# Patient Record
Sex: MALE | Race: WHITE | Employment: FULL TIME | URBAN - METROPOLITAN AREA
[De-identification: names, ages, dates, MRNs, and addresses within clinical notes are randomized per-mention and may not be internally consistent; named-entity substitution may affect disease eponyms.]

---

## 2021-05-05 ENCOUNTER — HOSPITAL ENCOUNTER (EMERGENCY)
Age: 76
Discharge: HOME | End: 2021-05-05
Attending: EMERGENCY MEDICINE
Payer: MEDICARE

## 2021-05-05 ENCOUNTER — APPOINTMENT (OUTPATIENT)
Dept: CT IMAGING | Age: 76
End: 2021-05-05
Attending: EMERGENCY MEDICINE
Payer: MEDICARE

## 2021-05-05 VITALS
DIASTOLIC BLOOD PRESSURE: 68 MMHG | BODY MASS INDEX: 28.2 KG/M2 | OXYGEN SATURATION: 96 % | HEIGHT: 70 IN | TEMPERATURE: 98.9 F | RESPIRATION RATE: 16 BRPM | HEART RATE: 59 BPM | SYSTOLIC BLOOD PRESSURE: 153 MMHG | WEIGHT: 197 LBS

## 2021-05-05 DIAGNOSIS — N20.1 URETERAL STONE: Primary | ICD-10-CM

## 2021-05-05 LAB
ALBUMIN SERPL-MCNC: 4.1 G/DL (ref 3.5–5)
ALBUMIN/GLOB SERPL: 1.6 {RATIO} (ref 1.1–2.2)
ALP SERPL-CCNC: 46 U/L (ref 45–117)
ALT SERPL-CCNC: 21 U/L (ref 12–78)
ANION GAP SERPL CALC-SCNC: 7 MMOL/L (ref 5–15)
APPEARANCE UR: ABNORMAL
AST SERPL-CCNC: 15 U/L (ref 15–37)
BACTERIA URNS QL MICRO: NEGATIVE /HPF
BASOPHILS # BLD: 0 K/UL (ref 0–0.1)
BASOPHILS NFR BLD: 0 % (ref 0–1)
BILIRUB SERPL-MCNC: 0.7 MG/DL (ref 0.2–1)
BILIRUB UR QL: NEGATIVE
BUN SERPL-MCNC: 27 MG/DL (ref 6–20)
BUN/CREAT SERPL: 22 (ref 12–20)
CALCIUM SERPL-MCNC: 9.3 MG/DL (ref 8.5–10.1)
CHLORIDE SERPL-SCNC: 105 MMOL/L (ref 97–108)
CO2 SERPL-SCNC: 30 MMOL/L (ref 21–32)
COLOR UR: ABNORMAL
COMMENT, HOLDF: NORMAL
CREAT SERPL-MCNC: 1.25 MG/DL (ref 0.7–1.3)
DIFFERENTIAL METHOD BLD: ABNORMAL
EOSINOPHIL # BLD: 0.1 K/UL (ref 0–0.4)
EOSINOPHIL NFR BLD: 1 % (ref 0–7)
EPITH CASTS URNS QL MICRO: NORMAL /LPF
ERYTHROCYTE [DISTWIDTH] IN BLOOD BY AUTOMATED COUNT: 13.1 % (ref 11.5–14.5)
GLOBULIN SER CALC-MCNC: 2.6 G/DL (ref 2–4)
GLUCOSE SERPL-MCNC: 108 MG/DL (ref 65–100)
GLUCOSE UR STRIP.AUTO-MCNC: NEGATIVE MG/DL
HCT VFR BLD AUTO: 43 % (ref 36.6–50.3)
HGB BLD-MCNC: 15.5 G/DL (ref 12.1–17)
HGB UR QL STRIP: ABNORMAL
IMM GRANULOCYTES # BLD AUTO: 0 K/UL (ref 0–0.04)
IMM GRANULOCYTES NFR BLD AUTO: 0 % (ref 0–0.5)
KETONES UR QL STRIP.AUTO: NEGATIVE MG/DL
LEUKOCYTE ESTERASE UR QL STRIP.AUTO: NEGATIVE
LYMPHOCYTES # BLD: 0.9 K/UL (ref 0.8–3.5)
LYMPHOCYTES NFR BLD: 10 % (ref 12–49)
MAGNESIUM SERPL-MCNC: 2 MG/DL (ref 1.6–2.4)
MCH RBC QN AUTO: 33.3 PG (ref 26–34)
MCHC RBC AUTO-ENTMCNC: 36 G/DL (ref 30–36.5)
MCV RBC AUTO: 92.5 FL (ref 80–99)
MONOCYTES # BLD: 0.4 K/UL (ref 0–1)
MONOCYTES NFR BLD: 4 % (ref 5–13)
NEUTS SEG # BLD: 8.4 K/UL (ref 1.8–8)
NEUTS SEG NFR BLD: 85 % (ref 32–75)
NITRITE UR QL STRIP.AUTO: NEGATIVE
NRBC # BLD: 0 K/UL (ref 0–0.01)
NRBC BLD-RTO: 0 PER 100 WBC
PH UR STRIP: 6 [PH] (ref 5–8)
PLATELET # BLD AUTO: 262 K/UL (ref 150–400)
PMV BLD AUTO: 9.4 FL (ref 8.9–12.9)
POTASSIUM SERPL-SCNC: 4.2 MMOL/L (ref 3.5–5.1)
PROT SERPL-MCNC: 6.7 G/DL (ref 6.4–8.2)
PROT UR STRIP-MCNC: NEGATIVE MG/DL
RBC # BLD AUTO: 4.65 M/UL (ref 4.1–5.7)
RBC #/AREA URNS HPF: NORMAL /HPF (ref 0–5)
SODIUM SERPL-SCNC: 142 MMOL/L (ref 136–145)
SP GR UR REFRACTOMETRY: 1.02 (ref 1–1.03)
UROBILINOGEN UR QL STRIP.AUTO: 0.2 EU/DL (ref 0.2–1)
WBC # BLD AUTO: 9.9 K/UL (ref 4.1–11.1)
WBC URNS QL MICRO: NORMAL /HPF (ref 0–4)

## 2021-05-05 PROCEDURE — 83735 ASSAY OF MAGNESIUM: CPT

## 2021-05-05 PROCEDURE — 81001 URINALYSIS AUTO W/SCOPE: CPT

## 2021-05-05 PROCEDURE — 99284 EMERGENCY DEPT VISIT MOD MDM: CPT

## 2021-05-05 PROCEDURE — 85025 COMPLETE CBC W/AUTO DIFF WBC: CPT

## 2021-05-05 PROCEDURE — 74011250636 HC RX REV CODE- 250/636: Performed by: EMERGENCY MEDICINE

## 2021-05-05 PROCEDURE — 96374 THER/PROPH/DIAG INJ IV PUSH: CPT

## 2021-05-05 PROCEDURE — 80053 COMPREHEN METABOLIC PANEL: CPT

## 2021-05-05 PROCEDURE — 36415 COLL VENOUS BLD VENIPUNCTURE: CPT

## 2021-05-05 PROCEDURE — 74176 CT ABD & PELVIS W/O CONTRAST: CPT

## 2021-05-05 RX ORDER — LANOLIN ALCOHOL/MO/W.PET/CERES
1000 CREAM (GRAM) TOPICAL DAILY
COMMUNITY

## 2021-05-05 RX ORDER — FISH OIL/DHA/EPA 1200-144MG
1200 CAPSULE ORAL DAILY
COMMUNITY

## 2021-05-05 RX ORDER — TEMAZEPAM 30 MG/1
30 CAPSULE ORAL
COMMUNITY

## 2021-05-05 RX ORDER — GLUCOSAMINE SULFATE 1500 MG
1000 POWDER IN PACKET (EA) ORAL DAILY
COMMUNITY

## 2021-05-05 RX ORDER — HYDROCODONE BITARTRATE AND ACETAMINOPHEN 5; 325 MG/1; MG/1
1 TABLET ORAL
Qty: 12 TAB | Refills: 0 | Status: SHIPPED | OUTPATIENT
Start: 2021-05-05 | End: 2021-05-08

## 2021-05-05 RX ORDER — NAPROXEN 250 MG/1
250 TABLET ORAL DAILY
COMMUNITY

## 2021-05-05 RX ORDER — TAMSULOSIN HYDROCHLORIDE 0.4 MG/1
0.4 CAPSULE ORAL DAILY
COMMUNITY

## 2021-05-05 RX ORDER — PSEUDOEPHEDRINE HCL 30 MG
30 TABLET ORAL
COMMUNITY

## 2021-05-05 RX ORDER — ASPIRIN 325 MG
325 TABLET ORAL 2 TIMES DAILY
COMMUNITY

## 2021-05-05 RX ORDER — LORATADINE 10 MG/1
10 TABLET ORAL
COMMUNITY

## 2021-05-05 RX ORDER — KETOROLAC TROMETHAMINE 30 MG/ML
15 INJECTION, SOLUTION INTRAMUSCULAR; INTRAVENOUS
Status: COMPLETED | OUTPATIENT
Start: 2021-05-05 | End: 2021-05-05

## 2021-05-05 RX ADMIN — SODIUM CHLORIDE 1000 ML: 9 INJECTION, SOLUTION INTRAVENOUS at 18:01

## 2021-05-05 RX ADMIN — KETOROLAC TROMETHAMINE 15 MG: 30 INJECTION, SOLUTION INTRAMUSCULAR; INTRAVENOUS at 18:01

## 2021-05-05 NOTE — ED PROVIDER NOTES
53-year-old male with a history of prostate CA and multiple and recurrent kidney stones presents with a chief complaint of left flank pain. Patient states that the pain started several days ago. He has been seen by [facility] but has not had imaging done. He states that the pain feels very similar to kidney stones he has had in the past.  He has had multiple lithotripsy procedures and stents in the past.  He denies any dysuria, hematuria, fevers or other symptoms. No past medical history on file. No past surgical history on file. No family history on file.     Social History     Socioeconomic History    Marital status: Not on file     Spouse name: Not on file    Number of children: Not on file    Years of education: Not on file    Highest education level: Not on file   Occupational History    Not on file   Social Needs    Financial resource strain: Not on file    Food insecurity     Worry: Not on file     Inability: Not on file    Transportation needs     Medical: Not on file     Non-medical: Not on file   Tobacco Use    Smoking status: Not on file   Substance and Sexual Activity    Alcohol use: Not on file    Drug use: Not on file    Sexual activity: Not on file   Lifestyle    Physical activity     Days per week: Not on file     Minutes per session: Not on file    Stress: Not on file   Relationships    Social connections     Talks on phone: Not on file     Gets together: Not on file     Attends Yarsani service: Not on file     Active member of club or organization: Not on file     Attends meetings of clubs or organizations: Not on file     Relationship status: Not on file    Intimate partner violence     Fear of current or ex partner: Not on file     Emotionally abused: Not on file     Physically abused: Not on file     Forced sexual activity: Not on file   Other Topics Concern    Not on file   Social History Narrative    Not on file         ALLERGIES: Celebrex [celecoxib], Glucosamine, Glucosamine-condroitin-herb182, Ibuprofen, and Symbicort [budesonide-formoterol]    Review of Systems   Constitutional: Negative for fever. HENT: Negative for rhinorrhea. Respiratory: Negative for cough. Cardiovascular: Negative for chest pain. Gastrointestinal: Negative for abdominal pain. Genitourinary: Positive for flank pain. Negative for dysuria. Musculoskeletal: Negative for back pain. Skin: Negative for wound. Neurological: Negative for headaches. Psychiatric/Behavioral: Negative for confusion. Vitals:    05/05/21 1708   BP: (!) 175/65   Pulse: 65   Resp: 18   Temp: 98.9 °F (37.2 °C)   SpO2: 97%   Weight: 89.4 kg (197 lb)   Height: 5' 10\" (1.778 m)            Physical Exam  Vitals signs and nursing note reviewed. Constitutional:       General: He is not in acute distress. Appearance: Normal appearance. He is not ill-appearing, toxic-appearing or diaphoretic. HENT:      Head: Normocephalic. Eyes:      Extraocular Movements: Extraocular movements intact. Neck:      Musculoskeletal: Normal range of motion. Cardiovascular:      Rate and Rhythm: Normal rate. Pulses: Normal pulses. Heart sounds: Normal heart sounds. No murmur. No gallop. Pulmonary:      Effort: Pulmonary effort is normal. No respiratory distress. Breath sounds: Normal breath sounds. No wheezing or rales. Abdominal:      General: Abdomen is flat. Bowel sounds are normal. There is no distension. Palpations: Abdomen is soft. Tenderness: There is no abdominal tenderness. There is no guarding. Musculoskeletal: Normal range of motion. Skin:     General: Skin is warm and dry. Capillary Refill: Capillary refill takes less than 2 seconds. Neurological:      Mental Status: He is alert and oriented to person, place, and time.    Psychiatric:         Mood and Affect: Mood normal.          MDM  Number of Diagnoses or Management Options  Ureteral stone  Diagnosis management comments: 42-year-old male with a history of recurrent ureteral stones presents with left flank pain consistent with prior stones. CT was obtained and shows a 3 mm distal ureteral stone. Urinalysis shows no evidence of infection and renal function is normal.  His pain was controlled with Toradol. He has close follow-up with [facility] already. He was prescribed Norco for pain control at home and is comfortable and agreeable with attempting to pass the stone at home. Discussed my clinical impression(s), any labs and/or radiology results with the patient. I answered any questions and addressed any concerns. Discussed the importance of following up with their primary care physician and/or specialist(s). Discussed signs or symptoms that would warrant return back to the ER for further evaluation. The patient is agreeable with discharge.        Amount and/or Complexity of Data Reviewed  Clinical lab tests: ordered and reviewed  Tests in the radiology section of CPT®: ordered and reviewed           Procedures

## 2021-05-05 NOTE — ED TRIAGE NOTES
Pt arrived with pain in left flank with a pain level at a 10. Pt went to his urologist 2 weeks ago,urologist stated he has 3 small stones in the right kidney. Attended a urology appointment because he was urinating a lot and feeling tender on both right and left flanks. Pt took pepto and anti-gas medications today before coming, however nothing for pain.

## 2021-05-05 NOTE — PROGRESS NOTES
[facility] Dosing Services    Assessment/Plan: Patient is 70years old. Ketorolac dose adjusted to 15 mg IV per P&T approved protocol for patient greater than 72years of age.     Murtaza Ball, Pharmacist

## 2021-05-05 NOTE — ED NOTES
The patient was discharged home by Dr. Helena Cook  in stable condition. The patient is alert and oriented, in no respiratory distress and discharge vital signs obtained. The patient's diagnosis, condition and treatment were explained. The patient expressed understanding. No prescriptions given. No work/school note given. A discharge plan has been developed. A  was not involved in the process. Aftercare instructions were given. Pt ambulatory out of the ED. Zully Do

## 2022-12-20 ENCOUNTER — HOSPITAL ENCOUNTER (EMERGENCY)
Age: 77
Discharge: HOME | End: 2022-12-20
Attending: EMERGENCY MEDICINE
Payer: MEDICARE

## 2022-12-20 VITALS
HEART RATE: 64 BPM | OXYGEN SATURATION: 97 % | SYSTOLIC BLOOD PRESSURE: 169 MMHG | HEIGHT: 70 IN | RESPIRATION RATE: 16 BRPM | WEIGHT: 202 LBS | DIASTOLIC BLOOD PRESSURE: 66 MMHG | BODY MASS INDEX: 28.92 KG/M2 | TEMPERATURE: 97.8 F

## 2022-12-20 DIAGNOSIS — H93.8X2 EAR FULLNESS, LEFT: Primary | ICD-10-CM

## 2022-12-20 PROCEDURE — 99283 EMERGENCY DEPT VISIT LOW MDM: CPT

## 2022-12-20 RX ORDER — AMOXICILLIN 875 MG/1
875 TABLET, FILM COATED ORAL 2 TIMES DAILY
Qty: 20 TABLET | Refills: 0 | Status: SHIPPED | OUTPATIENT
Start: 2022-12-20 | End: 2022-12-30

## 2022-12-20 RX ORDER — LISINOPRIL 2.5 MG/1
TABLET ORAL DAILY
COMMUNITY

## 2022-12-20 RX ORDER — GUAIFENESIN 600 MG/1
600 TABLET, EXTENDED RELEASE ORAL 2 TIMES DAILY
Qty: 10 TABLET | Refills: 0 | Status: SHIPPED | OUTPATIENT
Start: 2022-12-20 | End: 2022-12-25

## 2022-12-20 NOTE — ED TRIAGE NOTES
Pt presents ambulatory into ed a&ox3, no acute distress, breaths even and unlabored c/o L ear feeling blocked after being outside in the cold today at the park. Denies any pain. Reports this started today.

## 2022-12-20 NOTE — ED PROVIDER NOTES
68-year-old male presents from home with complaints of feeling like his left ear is blocked up. Symptoms started suddenly this afternoon he was outside in the cold at a park when he felt like something got in his ear which limited his hearing. He feels like there is wax or something occluding his ear canal.  Denies any pain. Denies any cough or fever. No drainage from the ear. States he did an earwax drops at home a few days ago and got a little bit of fluid out. 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 No other complaints at this time. Patient is new to this area and does not have an ENT doctor in this area that he is followed with in the past.       Past Medical History:   Diagnosis Date    Gastrointestinal disorder     Gout 2005    History of hemorrhoidectomy 1991    History of lithotripsy 2008    History of umbilical hernia repair 1009    Vertigo 2010       Past Surgical History:   Procedure Laterality Date    HX GI           History reviewed. No pertinent family history. Social History     Socioeconomic History    Marital status:      Spouse name: Not on file    Number of children: Not on file    Years of education: Not on file    Highest education level: Not on file   Occupational History    Not on file   Tobacco Use    Smoking status: Never    Smokeless tobacco: Never   Substance and Sexual Activity    Alcohol use:  Yes     Alcohol/week: 1.0 standard drink     Types: 1 Shots of liquor per week     Comment: per/day    Drug use: Never    Sexual activity: Not on file   Other Topics Concern    Not on file   Social History Narrative    Not on file     Social Determinants of Health     Financial Resource Strain: Not on file   Food Insecurity: Not on file   Transportation Needs: Not on file   Physical Activity: Not on file   Stress: Not on file   Social Connections: Not on file   Intimate Partner Violence: Not on file   Housing Stability: Not on file         ALLERGIES: Celebrex [celecoxib], Glucosamine, Glucosamine-condroitin-herb182, Ibuprofen, and Symbicort [budesonide-formoterol]    Review of Systems   Constitutional:  Negative for diaphoresis and fever. HENT:  Negative for facial swelling. Eyes:  Negative for visual disturbance. Respiratory:  Negative for cough. Cardiovascular:  Negative for chest pain. Gastrointestinal:  Negative for abdominal pain. Genitourinary:  Negative for dysuria. Musculoskeletal:  Negative for joint swelling. Skin:  Negative for rash. Neurological:  Negative for headaches. Hematological:  Negative for adenopathy. Psychiatric/Behavioral:  Negative for suicidal ideas. Vitals:    12/20/22 1636   BP: (!) 169/66   Pulse: 64   Resp: 16   Temp: 97.8 °F (36.6 °C)   SpO2: 97%   Weight: 91.6 kg (202 lb)   Height: 5' 10\" (1.778 m)            Physical Exam  Vitals and nursing note reviewed. Constitutional:       General: He is not in acute distress. Appearance: He is well-developed. He is not ill-appearing. HENT:      Head: Normocephalic and atraumatic. Right Ear: Tympanic membrane and ear canal normal.      Left Ear: Tympanic membrane normal.      Ears:      Comments: Small amount of earwax in the left ear canal.  No evidence of impaction. No foreign bodies. TM appears normal.  Eyes:      Pupils: Pupils are equal, round, and reactive to light. Cardiovascular:      Rate and Rhythm: Normal rate. Pulmonary:      Effort: Pulmonary effort is normal. No respiratory distress. Abdominal:      General: There is no distension. Musculoskeletal:         General: Normal range of motion. Cervical back: Normal range of motion and neck supple. Skin:     General: Skin is warm and dry. Neurological:      Mental Status: He is alert and oriented to person, place, and time.         MDM  Number of Diagnoses or Management Options  Ear fullness, left  Diagnosis management comments: Assessment: Patient here with a sudden onset of sensation of fullness in his left ear. Exam is really unremarkable with normal-appearing TM and only a small amount of earwax in the ear with no evidence of impaction. We irrigated the ear in the ED see if we could clear out any remaining wax and see if this help improve the symptoms. I do not see any obvious signs of an ear infection but would consider otitis media as a possible cause as well. I wrote a prescription for antibiotics. He could also have a middle ear effusion so we will try some decongestants. Patient is concerned because he had sudden hearing loss on his right side 12 years ago under similar circumstances. The etiology of this was never made clear. I have given him contact information follow-up with ENT in this area soon as possible for further evaluation.            Procedures

## 2022-12-22 ENCOUNTER — HOSPITAL ENCOUNTER (EMERGENCY)
Age: 77
Discharge: STILL ADMITTED OR ADMINISTRATIVE | End: 2022-12-22

## 2024-08-28 ENCOUNTER — ANESTHESIA EVENT (OUTPATIENT)
Facility: HOSPITAL | Age: 79
End: 2024-08-28
Payer: MEDICARE

## 2024-08-28 ENCOUNTER — HOSPITAL ENCOUNTER (OUTPATIENT)
Facility: HOSPITAL | Age: 79
Setting detail: OUTPATIENT SURGERY
Discharge: HOME | End: 2024-08-28
Attending: INTERNAL MEDICINE | Admitting: INTERNAL MEDICINE
Payer: MEDICARE

## 2024-08-28 ENCOUNTER — ANESTHESIA (OUTPATIENT)
Facility: HOSPITAL | Age: 79
End: 2024-08-28
Payer: MEDICARE

## 2024-08-28 VITALS
RESPIRATION RATE: 16 BRPM | BODY MASS INDEX: 27.21 KG/M2 | HEART RATE: 60 BPM | OXYGEN SATURATION: 96 % | TEMPERATURE: 97.9 F | SYSTOLIC BLOOD PRESSURE: 110 MMHG | HEIGHT: 70 IN | DIASTOLIC BLOOD PRESSURE: 56 MMHG | WEIGHT: 190.04 LBS

## 2024-08-28 PROCEDURE — 3700000001 HC ADD 15 MINUTES (ANESTHESIA): Performed by: INTERNAL MEDICINE

## 2024-08-28 PROCEDURE — 3700000000 HC ANESTHESIA ATTENDED CARE: Performed by: INTERNAL MEDICINE

## 2024-08-28 PROCEDURE — 6360000002 HC RX W HCPCS

## 2024-08-28 PROCEDURE — 7100000011 HC PHASE II RECOVERY - ADDTL 15 MIN: Performed by: INTERNAL MEDICINE

## 2024-08-28 PROCEDURE — 7100000010 HC PHASE II RECOVERY - FIRST 15 MIN: Performed by: INTERNAL MEDICINE

## 2024-08-28 PROCEDURE — 2580000003 HC RX 258

## 2024-08-28 PROCEDURE — 2709999900 HC NON-CHARGEABLE SUPPLY: Performed by: INTERNAL MEDICINE

## 2024-08-28 PROCEDURE — 3600007512: Performed by: INTERNAL MEDICINE

## 2024-08-28 PROCEDURE — 3600007502: Performed by: INTERNAL MEDICINE

## 2024-08-28 RX ORDER — ATORVASTATIN CALCIUM 20 MG/1
TABLET, FILM COATED ORAL
COMMUNITY

## 2024-08-28 RX ORDER — PROPOFOL 10 MG/ML
INJECTION, EMULSION INTRAVENOUS PRN
Status: DISCONTINUED | OUTPATIENT
Start: 2024-08-28 | End: 2024-08-28 | Stop reason: SDUPTHER

## 2024-08-28 RX ORDER — SODIUM CHLORIDE 9 MG/ML
INJECTION, SOLUTION INTRAVENOUS CONTINUOUS PRN
Status: DISCONTINUED | OUTPATIENT
Start: 2024-08-28 | End: 2024-08-28 | Stop reason: SDUPTHER

## 2024-08-28 RX ORDER — SODIUM CHLORIDE 9 MG/ML
INJECTION, SOLUTION INTRAVENOUS CONTINUOUS
Status: DISCONTINUED | OUTPATIENT
Start: 2024-08-28 | End: 2024-08-28 | Stop reason: HOSPADM

## 2024-08-28 RX ADMIN — PROPOFOL 50 MG: 10 INJECTION, EMULSION INTRAVENOUS at 08:11

## 2024-08-28 RX ADMIN — PROPOFOL 150 MCG/KG/MIN: 10 INJECTION, EMULSION INTRAVENOUS at 08:12

## 2024-08-28 RX ADMIN — SODIUM CHLORIDE: 9 INJECTION, SOLUTION INTRAVENOUS at 08:07

## 2024-08-28 ASSESSMENT — PAIN - FUNCTIONAL ASSESSMENT: PAIN_FUNCTIONAL_ASSESSMENT: 0-10

## 2024-08-28 NOTE — H&P
[facility]  [address] [place] [address]  [phone]                     History and Physical     NAME: Bret Davis   :  1945   MRN:  [id number]     HPI:   PT has family hx of CRC. Last colonoscopy 5 yrs ago.    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 No pertinent family history.  No current facility-administered medications for this encounter.         PHYSICAL EXAM:  General: WD, WN. Alert, cooperative, no acute distress    HEENT: NC, Atraumatic.  PERRLA, EOMI. Anicteric sclerae.  Lungs:  CTA Bilaterally. No Wheezing/Rhonchi/Rales.  Heart:  Regular  rhythm,  No murmur, No Rubs, No Gallops  Abdomen: Soft, Non distended, Non tender.  +Bowel sounds, no HSM  Extremities: No c/c/e  Neurologic:  CN 2-12 gi, Alert and oriented X 3.  No acute neurological distress   Psych:   Good insight. Not anxious nor agitated.           Assessment:   I have reviewed with the patient +/- family alternatives,benefits and risks for the

## 2024-08-28 NOTE — ANESTHESIA PRE PROCEDURE
Department of Anesthesiology  Preprocedure Note       Name:  Bret Davis   Age:  78 y.o.  :  1945                                          MRN:  [id number]         Date:  2024      Surgeon: Surgeon(s):  Nehemias Alvarenga MD    Procedure: Procedure(s):  COLONOSCOPY    Medications prior to admission:   Prior to Admission medications    Medication Sig Start Date End Date Taking? Authorizing Provider   vitamin D 25 MCG (1000 UT) CAPS Take 1 capsule by mouth daily   Yes Automatic Reconciliation, Ar   cyanocobalamin 1000 MCG tablet Take 1 tablet by mouth daily   Yes Automatic Reconciliation, Ar   lisinopril (PRINIVIL;ZESTRIL) 2.5 MG tablet Take by mouth daily   Yes Automatic Reconciliation, Ar   potassium gluconate 550 mg tablet Take 99 mg by mouth daily   Yes Automatic Reconciliation, Ar   pseudoephedrine (SUDAFED) 30 MG tablet Take 1 tablet by mouth every 4 hours as needed   Yes Automatic Reconciliation, Ar   atorvastatin (LIPITOR) 20 MG tablet Oral for 90 Days    Provider, MD [place]   aspirin 325 MG tablet Take 1 tablet by mouth 2 times daily    Automatic Reconciliation, Ar   loratadine (CLARITIN) 10 MG tablet Take 1 tablet by mouth daily as needed    Automatic Reconciliation, Ar   naproxen (NAPROSYN) 250 MG tablet Take 1 tablet by mouth daily    Automatic Reconciliation, Ar   tamsulosin (FLOMAX) 0.4 MG capsule Take 1 capsule by mouth daily    Automatic Reconciliation, Ar   temazepam (RESTORIL) 30 MG capsule Take 1 capsule by mouth.    Automatic Reconciliation, Ar       Current medications:    No current facility-administered medications for this encounter.       Allergies:    Allergies   Allergen Reactions    Glucosamine-Chondroitin Nausea Only    Ibuprofen Nausea Only    Budesonide-Formoterol Fumarate Rash    Celecoxib Palpitations and Other (See Comments)       Problem List:  There is no problem list on file for this patient.      Past Medical History:        Diagnosis Date    Arthritis     Basal

## 2024-08-28 NOTE — ANESTHESIA POSTPROCEDURE EVALUATION
Department of Anesthesiology  Postprocedure Note    Patient: Bret Davis  MRN: [id number]  YOB: 1945  Date of evaluation: 8/28/2024    Procedure Summary       Date: 08/28/24 Room / Location: [facility] 03 / [facility] ENDOSCOPY    Anesthesia Start: 0807 Anesthesia Stop: 0828    Procedure: COLONOSCOPY Diagnosis:       Family history of malignant neoplasm of gastrointestinal tract      Personal history of colonic polyps      (Family history of malignant neoplasm of gastrointestinal tract [Z80.0])      (Personal history of colonic polyps [Z86.010])    Surgeons: Nehemias Alvarenga MD Responsible Provider: Stoney Santamaria MD    Anesthesia Type: MAC ASA Status: 2            Anesthesia Type: MAC    Angel Phase I: Angel Score: 10    Angel Phase II: Angel Score: 10    Anesthesia Post Evaluation    Patient location during evaluation: PACU  Patient participation: complete - patient participated  Level of consciousness: awake  Airway patency: patent  Nausea & Vomiting: no vomiting and no nausea  Cardiovascular status: hemodynamically stable  Respiratory status: acceptable  Hydration status: stable  Pain management: adequate    No notable events documented.

## 2024-08-28 NOTE — OP NOTE
[facility]  [address] [place] [address]  [phone]                   Colonoscopy Operative Report      Indications:    Family history of coloretal cancer (screening only), Personal history of colon polyps (screening only)     :  Nehemias Alvarenga MD    Assistants: None    Referring Provider: Cherri Mejia MD    Sedation:  MAC anesthesia Propofol    Procedure Details:  After informed consent was obtained with all risks and benefits of procedure explained and preoperative exam completed, the patient was taken to the endoscopy suite and placed in the left lateral decubitus position.  Upon sequential sedation as per above, a digital rectal exam was performed  And was normal.  The Olympus videocolonoscope  was inserted in the rectum and carefully advanced to the cecum, which was identified by the ileocecal valve and appendiceal orifice.  The quality of preparation was excellent.  The colonoscope was slowly withdrawn with careful evaluation between folds. Retroflexion in the rectum was performed and was normal..     Findings:   Rectum: Grade 1 internal hemorrhoid(s);  Sigmoid:     -Diverticulosis  Descending Colon:     -Diverticulosis  Transverse Colon: normal  Ascending Colon: normal  Cecum: normal  Terminal Ileum: normal    Interventions:  none    Specimen Removed:  none    Implants: none    Complications: None.     EBL:  None.    Impression: Diverticulosis and internal hemorrhoids    Recommendations:   -High fiber diet.     -Resume normal medication(s)  -Call office for questions/concerns  -For screening purposes screening colonoscopy no longer recommended given age        Discharge Disposition:  Home in the company of a  when able to ambulate.    Nehemias Alvarenga MD  8/28/2024  8:26 AM

## 2024-08-28 NOTE — DISCHARGE INSTRUCTIONS
[facility]  [address] [place] [address]  [phone]                   Bret Davis  [id number]  1945    COLON DISCHARGE INSTRUCTIONS    DISCOMFORT:  Redness at IV site- apply warm compress to area; if redness or soreness persist- contact your physician  There may be a slight amount of blood passed from the rectum  Gaseous discomfort- walking, belching will help relieve any discomfort  You may not operate a vehicle for 12 hours  You may not  engage in an occupation involving machinery or appliances for rest of today  You may not  drink alcoholic beverages for at least 12 hours  Avoid making any critical decisions for at least 24 hour    DIET:   High fiber diet.   - however -  remember your colon is empty and a heavy meal will produce gas.   Avoid these foods:  vegetables, fried / greasy foods, carbonated drinks for today     ACTIVITY:  It is recommended that you spend the remainder of the day resting -  avoid any strenuous activity.  CALL M.D.  ANY SIGN OF:   Increasing pain, nausea, vomiting  Abdominal distension (swelling)  New increased bleeding (oral or rectal)  Fever (chills)  Pain in chest area  Bloody discharge from nose or mouth  Shortness of breath    You may resume medications    Post procedure diagnosis:  diverticulosis and internal hemorrhoids    Follow-up Instructions:    If a specimen was collected, you will receive a letter with the result by mail within two  weeks. Depending on the result this letter will specify your follow up colonoscopy date.      Please call us for any questions or concerns                     Bret Davis  [id number]  1945        DISCHARGE SUMMARY from Nurse    The following personal items collected during your admission are returned to you:   Dental Appliance:    Vision:    Hearing Aid:    Jewelry:    Clothing:    Other Valuables:    Valuables sent to safe:

## 2025-03-08 ENCOUNTER — HOSPITAL ENCOUNTER (EMERGENCY)
Facility: HOSPITAL | Age: 80
Discharge: HOME | End: 2025-03-08
Attending: STUDENT IN AN ORGANIZED HEALTH CARE EDUCATION/TRAINING PROGRAM
Payer: MEDICARE

## 2025-03-08 VITALS
HEART RATE: 82 BPM | SYSTOLIC BLOOD PRESSURE: 140 MMHG | BODY MASS INDEX: 27.92 KG/M2 | OXYGEN SATURATION: 98 % | TEMPERATURE: 98.1 F | DIASTOLIC BLOOD PRESSURE: 74 MMHG | RESPIRATION RATE: 16 BRPM | WEIGHT: 195 LBS | HEIGHT: 70 IN

## 2025-03-08 DIAGNOSIS — S61.451A CAT BITE OF RIGHT HAND, INITIAL ENCOUNTER: Primary | ICD-10-CM
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
## 2025-03-08 PROCEDURE — 90375 RABIES IG IM/SC: CPT | Performed by: STUDENT IN AN ORGANIZED HEALTH CARE EDUCATION/TRAINING PROGRAM

## 2025-03-08 PROCEDURE — 90675 RABIES VACCINE IM: CPT | Performed by: STUDENT IN AN ORGANIZED HEALTH CARE EDUCATION/TRAINING PROGRAM

## 2025-03-08 PROCEDURE — 99284 EMERGENCY DEPT VISIT MOD MDM: CPT

## 2025-03-08 PROCEDURE — 96372 THER/PROPH/DIAG INJ SC/IM: CPT

## 2025-03-08 PROCEDURE — 6360000002 HC RX W HCPCS: Performed by: STUDENT IN AN ORGANIZED HEALTH CARE EDUCATION/TRAINING PROGRAM

## 2025-03-08 PROCEDURE — 90471 IMMUNIZATION ADMIN: CPT | Performed by: STUDENT IN AN ORGANIZED HEALTH CARE EDUCATION/TRAINING PROGRAM

## 2025-03-08 PROCEDURE — 90472 IMMUNIZATION ADMIN EACH ADD: CPT | Performed by: STUDENT IN AN ORGANIZED HEALTH CARE EDUCATION/TRAINING PROGRAM

## 2025-03-08 PROCEDURE — 90714 TD VACC NO PRESV 7 YRS+ IM: CPT | Performed by: STUDENT IN AN ORGANIZED HEALTH CARE EDUCATION/TRAINING PROGRAM

## 2025-03-08 RX ADMIN — RABIES VACCINE 1 ML: KIT at 19:43

## 2025-03-08 RX ADMIN — RABIES IMMUNE GLOBULIN (HUMAN) 1770 UNITS: 300 INJECTION, SOLUTION INFILTRATION; INTRAMUSCULAR at 19:44

## 2025-03-08 RX ADMIN — CLOSTRIDIUM TETANI TOXOID ANTIGEN (FORMALDEHYDE INACTIVATED) AND CORYNEBACTERIUM DIPHTHERIAE TOXOID ANTIGEN (FORMALDEHYDE INACTIVATED) 0.5 ML: 5; 2 INJECTION, SUSPENSION INTRAMUSCULAR at 19:42

## 2025-03-08 ASSESSMENT — PAIN DESCRIPTION - LOCATION: LOCATION: FINGER (COMMENT WHICH ONE)

## 2025-03-08 ASSESSMENT — PAIN DESCRIPTION - ORIENTATION: ORIENTATION: RIGHT

## 2025-03-08 ASSESSMENT — LIFESTYLE VARIABLES
HOW MANY STANDARD DRINKS CONTAINING ALCOHOL DO YOU HAVE ON A TYPICAL DAY: PATIENT DOES NOT DRINK
HOW OFTEN DO YOU HAVE A DRINK CONTAINING ALCOHOL: NEVER

## 2025-03-08 ASSESSMENT — PAIN - FUNCTIONAL ASSESSMENT: PAIN_FUNCTIONAL_ASSESSMENT: 0-10

## 2025-03-08 ASSESSMENT — PAIN SCALES - GENERAL: PAINLEVEL_OUTOF10: 2

## 2025-03-08 NOTE — ED TRIAGE NOTES
Pt here with cat bit to right middle and pinky fingers from feral cat today. Last tetanus 2016. Washed with soap and water, peroxide and neosporin with band aids prior to arrival.

## 2025-03-09 NOTE — ED PROVIDER NOTES
instructions to return to the emergency department if symptoms worsen. No additional imaging or laboratory tests were deemed necessary at this time, as the clinical presentation did not suggest complications such as osteomyelitis or septic arthritis.    DIFFERENTIAL DIAGNOSES:  - Cellulitis: Considered due to the presence of mild erythema and abrasions on the hand, but less likely as there is no significant swelling, warmth, or systemic symptoms such as fever.  - Osteomyelitis: Considered because of the potential for deep infection following a bite, but unlikely given the absence of severe pain, swelling, or systemic symptoms, and no imaging findings suggestive of bone involvement.  - Septic arthritis: Considered due to the location of the bite near joints, but less likely as there is no joint swelling, warmth, or significant pain that would suggest joint involvement.  - Rabies: Considered due to the nature of the bite from a feral cat, but prophylaxis has been administered, making active rabies infection less likely.  - Tetanus: Considered due to the patient's outdated vaccination status, but less likely as prophylaxis has been administered and there are no symptoms such as muscle stiffness or spasms.    DIAGNOSIS:  Primary Diagnosis:  - Cat bite         Rajendra Oliver MD  03/09/25 1120

## 2025-03-11 ENCOUNTER — HOSPITAL ENCOUNTER (EMERGENCY)
Facility: HOSPITAL | Age: 80
Discharge: HOME | End: 2025-03-11
Attending: EMERGENCY MEDICINE
Payer: MEDICARE

## 2025-03-11 VITALS
RESPIRATION RATE: 18 BRPM | HEART RATE: 63 BPM | WEIGHT: 195 LBS | TEMPERATURE: 97.7 F | SYSTOLIC BLOOD PRESSURE: 143 MMHG | OXYGEN SATURATION: 96 % | BODY MASS INDEX: 27.92 KG/M2 | HEIGHT: 70 IN | DIASTOLIC BLOOD PRESSURE: 66 MMHG

## 2025-03-11 DIAGNOSIS — Z23 ENCOUNTER FOR REPEAT ADMINISTRATION OF RABIES VACCINATION: Primary | ICD-10-CM

## 2025-03-11 PROCEDURE — 4500000002 HC ER NO CHARGE

## 2025-03-11 PROCEDURE — 6360000002 HC RX W HCPCS: Performed by: EMERGENCY MEDICINE

## 2025-03-11 PROCEDURE — 90675 RABIES VACCINE IM: CPT | Performed by: EMERGENCY MEDICINE

## 2025-03-11 PROCEDURE — 90471 IMMUNIZATION ADMIN: CPT | Performed by: EMERGENCY MEDICINE

## 2025-03-11 PROCEDURE — 99284 EMERGENCY DEPT VISIT MOD MDM: CPT

## 2025-03-11 RX ADMIN — RABIES VACCINE 1 ML: KIT at 07:40

## 2025-03-11 ASSESSMENT — PAIN DESCRIPTION - PAIN TYPE: TYPE: ACUTE PAIN

## 2025-03-11 ASSESSMENT — PAIN DESCRIPTION - LOCATION: LOCATION: FINGER (COMMENT WHICH ONE)

## 2025-03-11 ASSESSMENT — PAIN DESCRIPTION - DESCRIPTORS: DESCRIPTORS: ACHING

## 2025-03-11 ASSESSMENT — PAIN DESCRIPTION - ORIENTATION: ORIENTATION: RIGHT

## 2025-03-11 ASSESSMENT — PAIN SCALES - GENERAL: PAINLEVEL_OUTOF10: 1

## 2025-03-11 ASSESSMENT — PAIN - FUNCTIONAL ASSESSMENT: PAIN_FUNCTIONAL_ASSESSMENT: 0-10

## 2025-03-11 NOTE — ED TRIAGE NOTES
Pt to ER via triage for 2nd rabies shot. Came in Saturday for feral cat bite to R middle finger and pinky finger. No other complaints. Denies fever.

## 2025-03-11 NOTE — ED PROVIDER NOTES
[facility]  EMERGENCY DEPARTMENT ENCOUNTER      Pt Name: Bret Davis  MRN: [id number]  Birthdate 1945  Date of evaluation: 3/11/2025  Provider: Lalo Michael DO      HISTORY OF PRESENT ILLNESS      HPI  79-year-old male presents to the emergency department for his second rabies shot after feral cat bite on Saturday.  States that he has been taking the antibiotics as prescribed and his hand injury seems to be improving.  He had no issues with the prior vaccination, no fever or any other medical complaints.      Nursing Notes were reviewed.    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    History of hemorrhoidectomy 1991    History of lithotripsy 2008    History of umbilical hernia repair 2010    Vertigo 2010         SURGICAL HISTORY       Past Surgical History:   Procedure Laterality Date    COLONOSCOPY N/A 8/28/2024    COLONOSCOPY performed by Nehemias Alvarenga MD at [facility]    GI      HEMORRHOID SURGERY      HERNIA REPAIR      KIDNEY STONE REMOVAL      SHOULDER SURGERY Left     rotator cuff repair    SHOULDER SURGERY Right     Bone spurs         CURRENT MEDICATIONS       Previous Medications    AMOXICILLIN-CLAVULANATE (AUGMENTIN) 875-125 MG PER TABLET    Take 1 tablet by mouth 2 times daily for 7 days    ASPIRIN 325 MG TABLET    Take 1 tablet by mouth 2 times daily    ATORVASTATIN (LIPITOR) 20 MG TABLET    Oral for 90 Days    CYANOCOBALAMIN 1000 MCG TABLET    Take 1 tablet by mouth daily    LISINOPRIL (PRINIVIL;ZESTRIL) 2.5 MG TABLET    Take by mouth daily    LORATADINE (CLARITIN) 10 MG TABLET    Take 1 tablet by mouth daily as needed    NAPROXEN (NAPROSYN) 250 MG TABLET    Take 1 tablet by mouth daily

## 2025-03-15 ENCOUNTER — HOSPITAL ENCOUNTER (EMERGENCY)
Facility: HOSPITAL | Age: 80
Discharge: HOME | End: 2025-03-15
Attending: EMERGENCY MEDICINE
Payer: MEDICARE

## 2025-03-15 VITALS
HEART RATE: 61 BPM | DIASTOLIC BLOOD PRESSURE: 65 MMHG | BODY MASS INDEX: 30.06 KG/M2 | TEMPERATURE: 97.9 F | OXYGEN SATURATION: 95 % | SYSTOLIC BLOOD PRESSURE: 167 MMHG | RESPIRATION RATE: 18 BRPM | HEIGHT: 70 IN | WEIGHT: 210 LBS

## 2025-03-15 DIAGNOSIS — Z23 ENCOUNTER FOR REPEAT ADMINISTRATION OF RABIES VACCINATION: Primary | ICD-10-CM

## 2025-03-15 PROCEDURE — 6360000002 HC RX W HCPCS: Performed by: EMERGENCY MEDICINE

## 2025-03-15 PROCEDURE — 90675 RABIES VACCINE IM: CPT | Performed by: EMERGENCY MEDICINE

## 2025-03-15 PROCEDURE — 4500000002 HC ER NO CHARGE

## 2025-03-15 PROCEDURE — 90471 IMMUNIZATION ADMIN: CPT | Performed by: EMERGENCY MEDICINE

## 2025-03-15 RX ADMIN — RABIES VACCINE 1 ML: KIT at 07:29

## 2025-03-15 ASSESSMENT — PAIN - FUNCTIONAL ASSESSMENT: PAIN_FUNCTIONAL_ASSESSMENT: NONE - DENIES PAIN

## 2025-03-15 NOTE — ED TRIAGE NOTES
Pt amb to ER for second rabies shot. Sts was here last Tuesday to receive shot from bite marks to R middle and pinky finger from feral cat. Pt denies any pain of fever. No other complaints.     Pt with bandaid to R middle and pinky finger. CDI.

## 2025-03-15 NOTE — DISCHARGE INSTRUCTIONS
DIAGNOSIS: You need a repeat rabies vaccination. This is because you were bitten by a feral cat, which can carry rabies. Rabies is a serious disease, but getting the vaccine can prevent it.    INSTRUCTIONS: Today, you received your third dose of the rabies vaccine. It is important to complete the full series of rabies shots to stay protected. You should continue taking any prescribed medications, like Augmentin, to help prevent infection from the cat bite. Keep the bandages on your fingers clean and dry.     FOLLOW-UP: Please return to the Emergency Department in one week for your fourth dose of the rabies vaccine. This is very important to make sure you are fully protected.    CONTACT THE DOCTOR RIGHT AWAY OR RETURN TO THE EMERGENCY DEPARTMENT IF: You notice any new swelling, redness, or pus around the bite wounds, if you develop a fever, or if you feel unwell in any way. These could be signs of an infection or other complications.    Take care and follow these instructions to ensure a smooth recovery.

## 2025-03-15 NOTE — ED NOTES
Pt given discharge instructions, 0 prescriptions, and pt education. Pt instructed to follow-up w PCP. Pt verbalizes understanding and all questions answered. Pt ambulatory out of ER unaccompanied with steady gait.

## 2025-03-15 NOTE — ED PROVIDER NOTES
[facility]  EMERGENCY DEPARTMENT ENCOUNTER      Pt Name: Bret Davis  MRN: [id number]  Birthdate 1945  Date of evaluation: 3/15/2025  Provider: Navneet Castro MD    CHIEF COMPLAINT       Chief Complaint   Patient presents with    Animal Bite       ALLERGIES     Glucosamine-chondroitin, Ibuprofen, Budesonide-formoterol fumarate, and Celecoxib    ENCOUNTER     HISTORY OF PRESENT ILLNESS:  Bret Davis is a 79-year-old male who presented to the Emergency Department after being bitten by a feral cat on the right middle and pinky fingers while attempting to trap it. The patient provided his own history. He initially visited the ED on March 8th and received two doses of the rabies vaccine, with the second dose administered on March 11th. He reports improvement in the condition of his hand, noting reduced swelling and discoloration. His tetanus vaccination is up to date.    PHYSICAL EXAM:  - General Appearance: Alert, no acute distress.  - Skin: Puncture wounds on the right middle and pinky fingers, covered with clean, dry, and intact bandages.  - HEENT: Normocephalic, atraumatic.  - Neck: Supple, no lymphadenopathy.  - Respiratory: Breath sounds clear bilaterally, no wheezes, rales, or rhonchi.  - Cardiovascular: Regular rate and rhythm, no murmurs, rubs, or gallops.  - Abdomen: Soft, non-tender, no distension.  - Musculoskeletal: Normal range of motion, no deformities.  - Neurological: Alert and oriented, no focal deficits.  - Psychiatric: Appropriate affect and demeanor.    SUMMARY:  Bret Davis, a 79-year-old male, presented with a need for a rabies vaccine after being bitten by a feral cat. He received two doses of the rabies vaccine previously and reported improvement in his hand condition. During this visit, he received the third dose of the rabies vaccine. The diagnosis was the need for repeat rabies vaccination. He was discharged with instructions to follow up in the ED in one week for  physician.    Interpretation per the Radiologist below, if available at the time of this note:    No orders to display        LABS:  Labs Reviewed - No data to display    CONSULTS:  None    PROCEDURES:     Procedures      FINAL IMPRESSION      1. Encounter for repeat administration of rabies vaccination          DISPOSITION/PLAN   DISPOSITION Decision To Discharge 03/15/2025 07:11:16 AM      PATIENT REFERRED TO:  [facility]  [address]  [place] [address]  [phone]  In 1 week  for last rabies vaccine administration      DISCHARGE MEDICATIONS:  Current Discharge Medication List            (Please note that portions of this note were completed with a transcription program.  Efforts were made to edit the dictations but occasionally words are mis-transcribed.)    Navneet Castro MD (electronically signed)  Emergency Attending Physician           Navneet Castro MD  03/15/25 [id number]

## 2025-03-22 ENCOUNTER — HOSPITAL ENCOUNTER (EMERGENCY)
Facility: HOSPITAL | Age: 80
Discharge: HOME | End: 2025-03-22
Attending: STUDENT IN AN ORGANIZED HEALTH CARE EDUCATION/TRAINING PROGRAM
Payer: MEDICARE

## 2025-03-22 VITALS
SYSTOLIC BLOOD PRESSURE: 153 MMHG | DIASTOLIC BLOOD PRESSURE: 58 MMHG | BODY MASS INDEX: 27.92 KG/M2 | RESPIRATION RATE: 18 BRPM | HEIGHT: 70 IN | TEMPERATURE: 97.5 F | WEIGHT: 195 LBS | OXYGEN SATURATION: 98 % | HEART RATE: 63 BPM

## 2025-03-22 DIAGNOSIS — Z23 ENCOUNTER FOR REPEAT ADMINISTRATION OF RABIES VACCINATION: Primary | ICD-10-CM

## 2025-03-22 PROCEDURE — 90471 IMMUNIZATION ADMIN: CPT | Performed by: STUDENT IN AN ORGANIZED HEALTH CARE EDUCATION/TRAINING PROGRAM

## 2025-03-22 PROCEDURE — 90675 RABIES VACCINE IM: CPT | Performed by: STUDENT IN AN ORGANIZED HEALTH CARE EDUCATION/TRAINING PROGRAM

## 2025-03-22 PROCEDURE — 4500000002 HC ER NO CHARGE

## 2025-03-22 PROCEDURE — 6360000002 HC RX W HCPCS: Performed by: STUDENT IN AN ORGANIZED HEALTH CARE EDUCATION/TRAINING PROGRAM

## 2025-03-22 PROCEDURE — 99284 EMERGENCY DEPT VISIT MOD MDM: CPT

## 2025-03-22 RX ADMIN — RABIES VACCINE 1 ML: KIT at 07:40

## 2025-03-22 ASSESSMENT — LIFESTYLE VARIABLES
HOW OFTEN DO YOU HAVE A DRINK CONTAINING ALCOHOL: NEVER
HOW MANY STANDARD DRINKS CONTAINING ALCOHOL DO YOU HAVE ON A TYPICAL DAY: PATIENT DOES NOT DRINK

## 2025-03-22 ASSESSMENT — PAIN - FUNCTIONAL ASSESSMENT
PAIN_FUNCTIONAL_ASSESSMENT: 0-10
PAIN_FUNCTIONAL_ASSESSMENT: ACTIVITIES ARE NOT PREVENTED

## 2025-03-22 NOTE — ED TRIAGE NOTES
Pt here for last rabies shot in series.   Patient arrives to ED ambulatory w/o difficulty. No acute distress noted in triage. A&O x 4. Skin is warm, dry & intact on obs.

## 2025-03-22 NOTE — ED PROVIDER NOTES
[facility]  EMERGENCY DEPARTMENT ENCOUNTER      Pt Name: Bret Davis  MRN: [id number]  Birthdate 1945  Date of evaluation: 3/22/2025  Provider: Franchesca Adamson DO    CHIEF COMPLAINT       Chief Complaint   Patient presents with    Injections         HISTORY OF PRESENT ILLNESS    HPI    Bret Davis is a 79 y.o. male who presents to the emergency department for rabies vaccine.  Patient was seen in the emergency department on 3/8 after being bit by a feral cat.  He received rabies immunoglobulin and vaccine on 3/8 followed by vaccine on 3/11 and 3/15.  He is here for his last dose of the rabies vaccination.  Tetanus was updated on 3/8.  He has finished his Augmentin antibiotics as well.  Wound is healing well without any complaints at this time.    Nursing Notes were reviewed.    REVIEW OF SYSTEMS       Review of Systems   Constitutional:  Negative for fever.   Skin:  Positive for wound.           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    History of hemorrhoidectomy 1991    History of lithotripsy 2008    History of umbilical hernia repair 2010    Vertigo 2010         SURGICAL HISTORY       Past Surgical History:   Procedure Laterality Date    COLONOSCOPY N/A 8/28/2024    COLONOSCOPY performed by Nehemias Alvarenga MD at [facility] ENDOSCOPY    GI      HEMORRHOID SURGERY      HERNIA REPAIR      KIDNEY STONE REMOVAL      SHOULDER SURGERY Left     rotator cuff repair    SHOULDER SURGERY Right     Bone spurs         CURRENT MEDICATIONS       Previous Medications    ASPIRIN 325 MG TABLET    Take 1 tablet by mouth 2 times daily    ATORVASTATIN (LIPITOR) 20 MG TABLET    Oral for 90 Days    CYANOCOBALAMIN 1000 MCG TABLET    Take 1 tablet by mouth daily    LISINOPRIL   Patient is afebrile.  On my examination, well-appearing, nontoxic.  Wounds are healing well without any signs of infection.  Patient will receive his final rabies vaccine today.  Patient was instructed on follow-up needs and ER return precautions.    Patient and/or family verbally conveyed their understanding and agreement of the patient's signs, symptoms, diagnosis, treatment and prognosis and additionally agree to follow-up as recommended in the discharge instructions or to return to the Emergency Department should their condition change or worsen prior to their follow-up appointment.  All questions have been answered and patient and/or available family express understanding.      Risk  Prescription drug management.        CONSULTS:  None    PROCEDURES:  Unless otherwise noted below, none     Procedures        FINAL IMPRESSION      1. Encounter for repeat administration of rabies vaccination          DISPOSITION/PLAN   DISPOSITION Discharge - Pending Orders Complete 03/22/2025 07:30:14 AM      PATIENT REFERRED TO:  No follow-up provider specified.    DISCHARGE MEDICATIONS:  New Prescriptions    No medications on file         (Please note that portions of this note were completed with a voice recognition program.  Efforts were made to edit the dictations but occasionally words are mis-transcribed.)    Franchesca Adamson DO (electronically signed)  Emergency Medicine Attending Physician          Franchesca Adamson DO  03/22/25 [id number]

## (undated) DEVICE — SINGLE USE AIR/WATER, SUCTION AND BIOPSY VALVES SET: Brand: ORCAPOD™